# Patient Record
Sex: MALE | URBAN - METROPOLITAN AREA
[De-identification: names, ages, dates, MRNs, and addresses within clinical notes are randomized per-mention and may not be internally consistent; named-entity substitution may affect disease eponyms.]

---

## 2020-03-13 ENCOUNTER — APPOINTMENT (OUTPATIENT)
Age: 40
Setting detail: DERMATOLOGY
End: 2020-03-18

## 2020-03-13 DIAGNOSIS — S31000A OPEN WOUND(S) (MULTIPLE) OF UNSPECIFIED SITE(S), WITHOUT MENTION OF COMPLICATION: ICD-10-CM

## 2020-03-13 PROBLEM — S51.801A UNSPECIFIED OPEN WOUND OF RIGHT FOREARM, INITIAL ENCOUNTER: Status: ACTIVE | Noted: 2020-03-13

## 2020-03-13 PROCEDURE — 99201: CPT

## 2020-03-13 PROCEDURE — OTHER ADDITIONAL NOTES: OTHER

## 2020-03-13 PROCEDURE — OTHER PRESCRIPTION: OTHER

## 2020-03-13 RX ORDER — SILVER SULFADIAZINE 10 MG/G
CREAM TOPICAL
Qty: 1 | Refills: 3 | Status: ERX | COMMUNITY
Start: 2020-03-13

## 2020-03-13 ASSESSMENT — LOCATION DETAILED DESCRIPTION DERM: LOCATION DETAILED: RIGHT PROXIMAL RADIAL DORSAL FOREARM

## 2020-03-13 ASSESSMENT — LOCATION SIMPLE DESCRIPTION DERM: LOCATION SIMPLE: RIGHT FOREARM

## 2020-03-13 ASSESSMENT — LOCATION ZONE DERM: LOCATION ZONE: ARM

## 2020-03-13 NOTE — PROCEDURE: ADDITIONAL NOTES
Additional Notes: Patient had a PG removed and has one edge that is still open but is not bleeding. Discussed that this is something that could potentially grow back if not removed properly. Will start cream and if it begins to grow back, will remove. All questions answered.
Detail Level: Simple

## 2020-04-24 ENCOUNTER — APPOINTMENT (OUTPATIENT)
Dept: URBAN - METROPOLITAN AREA CLINIC 270 | Age: 40
Setting detail: DERMATOLOGY
End: 2020-04-24

## 2020-04-24 DIAGNOSIS — S31000A OPEN WOUND(S) (MULTIPLE) OF UNSPECIFIED SITE(S), WITHOUT MENTION OF COMPLICATION: ICD-10-CM

## 2020-04-24 PROBLEM — T07.XXXA UNSPECIFIED MULTIPLE INJURIES, INITIAL ENCOUNTER: Status: ACTIVE | Noted: 2020-04-24

## 2020-04-24 PROCEDURE — 99212 OFFICE O/P EST SF 10 MIN: CPT

## 2020-04-24 PROCEDURE — OTHER ADDITIONAL NOTES: OTHER

## 2020-04-24 NOTE — PROCEDURE: ADDITIONAL NOTES
Detail Level: Simple
Additional Notes: Discussed what the diagnosis was (PG) and that it is now resolved. All questions answered.